# Patient Record
Sex: FEMALE | Race: AMERICAN INDIAN OR ALASKA NATIVE | HISPANIC OR LATINO | URBAN - METROPOLITAN AREA
[De-identification: names, ages, dates, MRNs, and addresses within clinical notes are randomized per-mention and may not be internally consistent; named-entity substitution may affect disease eponyms.]

---

## 2023-07-03 ENCOUNTER — OFFICE VISIT (OUTPATIENT)
Dept: OBSTETRICS AND GYNECOLOGY | Facility: CLINIC | Age: 28
End: 2023-07-03

## 2023-07-03 VITALS — DIASTOLIC BLOOD PRESSURE: 82 MMHG | WEIGHT: 166.44 LBS | SYSTOLIC BLOOD PRESSURE: 120 MMHG

## 2023-07-03 DIAGNOSIS — Z30.011 ENCOUNTER FOR INITIAL PRESCRIPTION OF CONTRACEPTIVE PILLS: ICD-10-CM

## 2023-07-03 DIAGNOSIS — Z30.432 ENCOUNTER FOR IUD REMOVAL: Primary | ICD-10-CM

## 2023-07-03 PROCEDURE — 99203 OFFICE O/P NEW LOW 30 MIN: CPT | Mod: PBBFAC,PO,25

## 2023-07-03 PROCEDURE — 99999 PR PBB SHADOW E&M-NEW PATIENT-LVL III: CPT | Mod: PBBFAC,,,

## 2023-07-03 PROCEDURE — 99203 PR OFFICE/OUTPT VISIT, NEW, LEVL III, 30-44 MIN: ICD-10-PCS | Mod: S$PBB,25,,

## 2023-07-03 PROCEDURE — 99999 PR PBB SHADOW E&M-NEW PATIENT-LVL III: ICD-10-PCS | Mod: PBBFAC,,,

## 2023-07-03 PROCEDURE — 58301 REMOVE INTRAUTERINE DEVICE: CPT | Mod: S$PBB,,,

## 2023-07-03 PROCEDURE — 99203 OFFICE O/P NEW LOW 30 MIN: CPT | Mod: S$PBB,25,,

## 2023-07-03 PROCEDURE — 58301 REMOVAL OF IUD: ICD-10-PCS | Mod: S$PBB,,,

## 2023-07-03 PROCEDURE — 58301 REMOVE INTRAUTERINE DEVICE: CPT | Mod: PBBFAC,PO

## 2023-07-03 RX ORDER — NORGESTIMATE AND ETHINYL ESTRADIOL 0.25-0.035
1 KIT ORAL DAILY
Qty: 28 TABLET | Refills: 11 | Status: SHIPPED | OUTPATIENT
Start: 2023-07-03

## 2023-07-03 NOTE — PROGRESS NOTES
CC: Vaginal discharge    Saadia Romo is a 28 y.o. female  presents with complaint of irregular bleeding x2 months. She had Paraguard IUD inserted 9mo ago in [place]. She would like it removed today and to start oral birth control.    History reviewed. No pertinent past medical history.  History reviewed. No pertinent surgical history.  Social History     Tobacco Use    Smoking status: Never    Smokeless tobacco: Never   Substance Use Topics    Drug use: Never     OB History    Para Term  AB Living   2 1     1 1   SAB IAB Ectopic Multiple Live Births                  # Outcome Date GA Lbr Korey/2nd Weight Sex Delivery Anes PTL Lv   2 AB            1 Para      Vag-Spont          /82   Wt 75.5 kg (166 lb 7.2 oz)   LMP 05/15/2023     ROS:  GENERAL: No fever, chills, fatigability or weight loss.  ABDOMEN: No abdominal pain. Denies nausea or vomiting. No diarrhea or constipation  URINARY: No frequency, dysuria, hematuria.  VULVOVAGINAL: See HPI.  NEUROLOGICAL: No headaches. No vision changes.    PHYSICAL EXAM:  APPEARANCE: Well nourished, well developed, in no acute distress.  AFFECT: WNL, alert and oriented x 3  SKIN: No acne or hirsutism  CHEST: Good respiratory effect  ABDOMEN: Soft.  No tenderness or masses  PELVIC: Normal external genitalia without lesions.  Normal hair distribution.  Adequate perineal body, normal urethral meatus.  Vagina moist and well rugated without lesions or discharge.  Cervix pink, without lesions, discharge or tenderness.  Blood present.  EXTREMITIES: No edema.    ASSESSMENT and PLAN:  1. Encounter for IUD removal  Removal of IUD      2. Encounter for initial prescription of contraceptive pills  norgestimate-ethinyl estradioL (ORTHO-CYCLEN) 0.25-35 mg-mcg per tablet          SEE PROCEDURE NOTE. Patient tolerated procedure well.    Start OCPs today. Use barrier protection during intercourse for first 7 days of pack.    Patient confirms understanding of encounter  and all medical questions answered.

## 2023-07-03 NOTE — PROCEDURES
Removal of IUD    Date/Time: 7/3/2023 1:51 PM  Performed by: Gracie Mccracken PA-C  Authorized by: Gracie Mccracken PA-C     Consent obtained:  Written  Consent given by:  Patient  Procedure risks and benefits discussed: yes    Patient questions answered: yes    Patient agrees, verbalizes understanding, and wants to proceed: yes    Educational handouts given: no    Instructions and paperwork completed: yes    Implant grasped by: ring forceps  Removal due to infection and inflammatory reaction: no    Other reason for removal:  Irregular bleeding  Removal due to mechanical complications of IUD/Nexplanon: no    Removed with no complications: yes